# Patient Record
Sex: MALE | Race: BLACK OR AFRICAN AMERICAN
[De-identification: names, ages, dates, MRNs, and addresses within clinical notes are randomized per-mention and may not be internally consistent; named-entity substitution may affect disease eponyms.]

---

## 2017-05-16 NOTE — DIAGNOSTIC IMAGING REPORT
Indication: Chest pain, productive cough



Technique:  Single portable AP view of the chest.



Findings:



Comparison:  None.



Small circumscribed lucencies now project over the glenoid process of the right

scapula. Small triangular osseous density now projects adjacent to the distal margin

of the left clavicle. The extra pulmonary soft tissues, cardiomediastinal

silhouette, pulmonary vasculature and parenchyma, and pleural surfaces are

unremarkable.



IMPRESSION:



No evidence of acute cardiopulmonary disease, unchanged



Suggestion of interval fracture of distal end of left clavicle, acuity

indeterminate. Recommend dedicated left shoulder radiographs for further evaluation.



Small cystic lucencies projecting over right glenoid process, nonspecific, may

represent overlying soft tissue gas, vacuum phenomenon within the glenohumeral

joint, or degenerative cysts. Recommend dedicated right shoulder radiographs for

further evaluation..

## 2017-05-19 NOTE — EMERGENCY ROOM REPORT
History of Present Illness


General


Chief Complaint:  Upper Respiratory Illness


Source:  Patient





Present Illness


HPI


Patient's 21-year-old male who presented after having increased cough and nasal 

congestion for several weeks.  Patient gradual onset of symptoms.  Patient was 

noted to have been coughing some yellow-green sputum.  He had not been 

vomiting.  He denied any fever.  He reported having a moderate sore throat.  He 

denied any leg pain or swelling.  Patient had symptoms for several months.  The 

patient had a previous been taking allergy medications.


Allergies:  


Coded Allergies:  


     No Known Allergies (Unverified , 2/7/13)





Patient History


Past Medical History:  see triage record


Reviewed Nursing Documentation:  PMH: Agreed, PSxH: Agreed





Nursing Documentation-PMH


Past Medical History:  No Stated History





Review of Systems


All Other Systems:  negative except mentioned in HPI





Physical Exam





Vital Signs








  Date Time  Temp Pulse Resp B/P Pulse Ox O2 Delivery O2 Flow Rate FiO2


 


5/15/17 20:31 98.1 86 18 122/44 98 Room Air  








General Appearance:  well appearing, no apparent distress, alert, GCS 15


Head:  normocephalic, atraumatic


ENT:  hearing grossly normal, normal voice


Neck:  full range of motion, supple


Respiratory:  no respiratory distress, speaking full sentences


Gastrointestinal:  normal inspection, normal bowel sounds, non tender, soft


Musculoskeletal:  normal inspection, back normal, digits/nails normal, no calf 

tenderness


Neurologic:  normal inspection, alert, oriented x3, responsive, normal gait


Psychiatric:  mood/affect normal


Skin:  no rash





Medical Decision Making


Diagnostic Impression:  


 Primary Impression:  


 Chronic cough


ER Course


Patient presented for cough. Differential diagnosis included but was not 

limited to bronchitis, pneumonia, pulmonary embolism, pericarditis, asthma, 

foreign body.


X-ray imaging of the chest one view interpreted by me showed no evident 

infiltrate with normal cardiac size with no pneumothorax.  The patient was 

given symptomatic treatment prescriptions.The patient is advised to follow up 

with  primary care doctor in 1-2 days.  Patient is advised to return if any 

worsening condition or if any changes in status that are concerning.





Last Vital Signs








  Date Time  Temp Pulse Resp B/P Pulse Ox O2 Delivery O2 Flow Rate FiO2


 


5/15/17 21:45 98.1  18 122/44 98 Room Air  


 


5/15/17 20:35  72      








Status:  improved


Disposition:  HOME, SELF-CARE


Condition:  Stable


Scripts


Guaifenesin/Codeine Phos* (ROBITUSSIN AC*) 118 Ml Liquid


5 ML ORAL Q6H Y for For Cough, #118 ML 0 Refills


   Prov: Reid Donohue         5/15/17 


Loratadine (CLARITIN) 10 Mg Tablet


10 MG ORAL DAILY, #30 TAB


   Prov: Reid Donohue         5/15/17


Referrals:  


NOT CHOSEN [facility],REFERRING (PCP)


Patient Instructions:  Upper Respiratory Infection, Adult











Reid Donohue May 19, 2017 19:36

## 2017-06-14 NOTE — EMERGENCY ROOM REPORT
History of Present Illness


General


Chief Complaint:  Male Urogenital Problems


Source:  Patient





Present Illness


HPI


21YOM walk-in with 2 complaints


1) Cough for 6 months. Dry.  No associated fever/chills.  Improved with Rx 

cough syrup, loratadine given here in May.  + fam history of asthma.  Cough 

worse after training for football.


2) Left testicle pain. No acute trauma.  No dysuria, dishcharge. 1 day.  

Negative STD testing last week.


Allergies:  


Coded Allergies:  


     No Known Allergies (Unverified , 2/7/13)





Patient History


Past Medical History:  none


Past Surgical History:  none


Pertinent Family History:  asthma


Social History:  Denies: alcohol use, drug use, smoking


Immunizations:  UTD


Reviewed Nursing Documentation:  PMH: Agreed, PSxH: Agreed





Review of Systems


All Other Systems:  negative except mentioned in HPI





Physical Exam





Vital Signs








  Date Time  Temp Pulse Resp B/P Pulse Ox O2 Delivery O2 Flow Rate FiO2


 


6/14/17 00:36 98.1 80 18 118/65 98 Room Air  








Sp02 EP Interpretation:  reviewed, normal


General Appearance:  normal inspection, well appearing, no apparent distress, 

alert, GCS 15, non-toxic, other - Very well appearing, laughing, texting on 

cell phone


Head:  normocephalic, atraumatic


Eyes:  bilateral eye EOMI, bilateral eye PERRL


ENT:  normal ENT inspection, hearing grossly normal, normal voice


Neck:  normal inspection, full range of motion, supple, no bony tend


Respiratory:  normal inspection, lungs clear, normal breath sounds, no 

respiratory distress, no retraction, no wheezing


Cardiovascular #1:  regular rate, rhythm, no edema


Gastrointestinal:  normal inspection, normal bowel sounds, non tender, soft, no 

guarding, no hernia


Genitourinary:  no CVA tenderness, penis normal, scrotum normal


Musculoskeletal:  normal inspection, back normal, normal range of motion, Hilario'

s Sign negative


Neurologic:  normal inspection, alert, oriented x3, responsive, CNs III-XII nml 

as tested, speech normal


Psychiatric:  normal inspection, judgement/insight normal, mood/affect normal


Skin:  normal inspection, normal color, no rash





Medical Decision Making


Diagnostic Impression:  


 Primary Impression:  


 Chronic cough


 Additional Impression:  


 Left testicular pain


ER Course


Chronic cough


- VSS. Afebrile.


- Not hypoxic.  No acute distress


- Not worse with lying down, after eating.  Doubt GERD


- Worse with exercising, + fam history of asthma.  Possible exercise induced 

asthma


- Given duration, CAP also possibility although bronchitis viral most likely Dx


- Rx Z-pack, Refill cough medicine, Ventolin to be used before/after training/

exercise





Left testicular pain


- No acute trauma


- Non focal exam


- Doubt torsion given well apperance, normal vertical lie of left testicle


- UA: No WBCs or hematuria.  Doubt epididmytis. 


- Advised to refrain from frequent sexual intercourse until pain resolves





Last Vital Signs








  Date Time  Temp Pulse Resp B/P Pulse Ox O2 Delivery O2 Flow Rate FiO2


 


6/14/17 00:36 98.1 80 18 118/65 98 Room Air  








Status:  improved


Disposition:  HOME, SELF-CARE


Scripts


Albuterol Sulfate (VENTOLIN HFA) 18 Gm Hfa.aer.ad


1 PUFF INH EVERY 6 HOURS for For Cough, #18 GM 0 Refills


   Prov: MARYANN KOTHARI M.D.         6/14/17 


Azithromycin* (ZITHROMAX*) 250 Mg Tablet


250 MG ORAL DAILY, #6 TAB 0 Refills


   Take two tablets by mouth today, then take one tablet by mouth daily


   for four days


   Prov: MARYANN KOTHARI M.D.         6/14/17 


Guaifenesin/Codeine Phos* (ROBITUSSIN AC*) 118 Ml Liquid


5 ML ORAL Q6H Y for For Cough, #118 ML 0 Refills


   Prov: MARYANN KOTHARI M.D.         6/14/17











MARYANN KOTHARI M.D. Jun 14, 2017 00:58

## 2017-08-24 NOTE — EMERGENCY ROOM REPORT
History of Present Illness


General


Chief Complaint:  Earache


Source:  Patient





Present Illness


HPI


This is a 22-year-old male with no past medical history.  He presents with 

chief complaint.  One is a chronic cough that has been ongoing for many months.

  He said it was started in December when he was in [place].  He gets sick and 

this has been coughing on and off.  Seen multiple doctors here and had urgent 

care.  Been on antibiotics with no relief.  Cough medicine without any relief.  

Also got steroids.  He came in also with the complaint of left ear pain.  Onset 

for last 2 days.  No recent cold.  No nausea no vomiting.  No fever or chills.


Allergies:  


Coded Allergies:  


     No Known Allergies (Unverified , 2/7/13)





Patient History


Past Medical History:  see triage record, old chart reviewed


Past Surgical History:  none


Pertinent Family History:  none


Social History:  Denies: smoking


Immunizations:  other


Reviewed Nursing Documentation:  PMH: Agreed, PSxH: Agreed





Nursing Documentation-PMH


Past Medical History:  No Stated History





Review of Systems


Eye:  Denies: eye pain, blurred vision


ENT:  Reports: ear pain, Denies: nose congestion, throat swelling


Respiratory:  Reports: cough, Denies: shortness of breath


Cardiovascular:  Denies: chest pain, palpitations


Gastrointestinal:  Denies: abdominal pain, diarrhea, nausea, vomiting


Musculoskeletal:  Denies: back pain, joint pain


Skin:  Denies: rash


Neurological:  Denies: headache, numbness


Endocrine:  Denies: increased thirst, increased urine


Hematologic/Lymphatic:  Denies: easy bruising


All Other Systems:  negative except mentioned in HPI





Physical Exam





Vital Signs








  Date Time  Temp Pulse Resp B/P (MAP) Pulse Ox O2 Delivery O2 Flow Rate FiO2


 


8/24/17 02:02 97.9 64 14 149/79 100 Room Air  





vitals unremarkable


Sp02 EP Interpretation:  reviewed, normal


General Appearance:  well appearing, no apparent distress, alert


Head:  normocephalic, atraumatic


Eyes:  bilateral eye PERRL, bilateral eye EOMI


ENT:  hearing grossly normal, normal pharynx, other - Left TM with effusion and 

bulging TM.


Neck:  full range of motion, supple, no meningismus


Respiratory:  chest non-tender, lungs clear, normal breath sounds


Cardiovascular #1:  regular rate, rhythm, no murmur


Gastrointestinal:  normal bowel sounds, non tender, no mass, no organomegaly, 

no bruit, non-distended


Musculoskeletal:  back normal, gait/station normal, normal range of motion


Psychiatric:  mood/affect normal


Skin:  warm/dry





Medical Decision Making


Diagnostic Impression:  


 Primary Impression:  


 Chronic cough


 Additional Impression:  


 Left otitis media


 Qualified Codes:  H65.02 - Acute serous otitis media, left ear


ER Course


Patient with a cough.  Lungs are clear.  No evidence of pneumonia.  This may be 

a postnasal drip.  No evidence of pneumonia.  His ear show effusion.  Most 

likely sinus related.  We'll put on decongestant antibiotics.  He may have 

allergies.  If not better he may benefit from referral to see a specialist.





Last Vital Signs








  Date Time  Temp Pulse Resp B/P (MAP) Pulse Ox O2 Delivery O2 Flow Rate FiO2


 


8/24/17 02:02 97.9 64 14 149/79 100 Room Air  








Status:  unchanged


Disposition:  HOME, SELF-CARE


Condition:  Stable


Scripts


Amoxicillin/Potassium Clav 875-125* (AUGMENTIN 875-125 TABLET*) 1 Each Tablet


1 TAB ORAL TWICE A DAY, #14 TAB


   Prov: TI BARKLEY M.D.         8/24/17 


Loratadine/Pseudoephedrine (CLARITIN-D 24 HOUR TABLET) 1 Each Tab.er.24h


1 TAB PO DAILY, #30 TAB


   Prov: TI BARKLEY M.D.         8/24/17


Patient Instructions:  Otitis Media, Adult, Easy-to-Read





Additional Instructions:  


Followup with your DrSamantha in 7 days.  You may benefit from referral to see if nose 

and throat DrSamantha or one specialist if not better.  Return if symptom worsen.











TI BARKLEY M.D. Aug 24, 2017 02:20

## 2018-02-25 NOTE — EMERGENCY ROOM REPORT
History of Present Illness


General


Chief Complaint:  General Complaint


Source:  Patient





Present Illness


HPI


Patient presents with complaints of cough 


Reports of the cough has been ongoing for the past 2 years





Denies any chest pain or shortness of breath


Denies any vomiting patient feels that he has been more gassy than normal





Denies any vomiting or diarrhea denies any recent travel





However he reports that 2 years ago when he visited [place] is when his 

symptoms initially started


Allergies:  


Coded Allergies:  


     No Known Allergies (Unverified , 2/7/13)





Patient History


Past Medical History:  see triage record


Pertinent Family History:  none


Reviewed Nursing Documentation:  PMH: Agreed, PSxH: Agreed





Nursing Documentation-PMH


Past Medical History:  No Stated History





Review of Systems


All Other Systems:  negative except mentioned in HPI





Physical Exam





Vital Signs








  Date Time  Temp Pulse Resp B/P (MAP) Pulse Ox O2 Delivery O2 Flow Rate FiO2


 


2/25/18 22:47 97.7 70 16 131/78 99 Room Air  





 97.7       








Sp02 EP Interpretation:  reviewed, normal


General Appearance:  well appearing, no apparent distress


Head:  normocephalic, atraumatic


Eyes:  bilateral eye PERRL, bilateral eye EOMI


ENT:  hearing grossly normal, normal pharynx, TMs + canals normal, uvula midline


Neck:  full range of motion, supple, no meningismus, no bony tend


Respiratory:  lungs clear, normal breath sounds, no rhonchi, no respiratory 

distress, no retraction, no accessory muscle use


Cardiovascular #1:  normal peripheral pulses, regular rate, rhythm, no edema, 

no gallop, no JVD, no murmur


Gastrointestinal:  normal bowel sounds, non tender, soft, no mass, no 

organomegaly, non-distended, no guarding, no hernia, no pulsatile mass, no 

rebound


Genitourinary:  no CVA tenderness


Musculoskeletal:  normal inspection


Neurologic:  oriented x3, responsive, CNs III-XII nml as tested, motor strength/

tone normal, sensory intact


Psychiatric:  mood/affect normal


Skin:  normal color, no rash, warm/dry, palpation normal


Lymphatic:  normal inspection, no adenopathy





Medical Decision Making


Diagnostic Impression:  


 Primary Impression:  


 Chronic cough


ER Course


Will differentials considered


Including but not limited to cardiac, cardiopulmonary, gastric pathology





Patient has had previous x-ray here


There appears to be no significant change from his previous presentation





Patient remains hemodynamically stable lungs sounds are appropriate


Patient reports that he has not been able to see any primary physician since 

his initial visit





And was provided with appropriate outpatient followup





Last Vital Signs








  Date Time  Temp Pulse Resp B/P (MAP) Pulse Ox O2 Delivery O2 Flow Rate FiO2


 


2/25/18 22:47 97.7 70 16 131/78 99 Room Air  





 97.7       








Status:  unchanged


Disposition:  HOME, SELF-CARE


Condition:  Stable





Additional Instructions:  


Patient is provided with the discharge instructions notified to follow up with 

primary doctor in the next 2-3 days otherwise return to the er with any 

worsening symptoms.


Please note that this report is being documented using DRAGON technology.  This 

can lead to erroneous entry secondary to incorrect interpretation by the 

dictating instrument.











WAYNE ABRAMS D.O. Feb 25, 2018 23:14

## 2020-05-12 ENCOUNTER — HOSPITAL ENCOUNTER (EMERGENCY)
Dept: HOSPITAL 72 - EMR | Age: 25
Discharge: HOME | End: 2020-05-12
Payer: MEDICAID

## 2020-05-12 VITALS — DIASTOLIC BLOOD PRESSURE: 80 MMHG | SYSTOLIC BLOOD PRESSURE: 122 MMHG

## 2020-05-12 VITALS — WEIGHT: 180 LBS | BODY MASS INDEX: 27.28 KG/M2 | HEIGHT: 68 IN

## 2020-05-12 VITALS — SYSTOLIC BLOOD PRESSURE: 125 MMHG | DIASTOLIC BLOOD PRESSURE: 82 MMHG

## 2020-05-12 DIAGNOSIS — R07.89: Primary | ICD-10-CM

## 2020-05-12 DIAGNOSIS — Z72.89: ICD-10-CM

## 2020-05-12 PROCEDURE — 99283 EMERGENCY DEPT VISIT LOW MDM: CPT

## 2020-05-12 PROCEDURE — 93005 ELECTROCARDIOGRAM TRACING: CPT

## 2020-05-12 PROCEDURE — 71045 X-RAY EXAM CHEST 1 VIEW: CPT

## 2020-05-12 NOTE — EMERGENCY ROOM REPORT
History of Present Illness


General


Chief Complaint:  General Complaint


Source:  Patient





Present Illness


HPI


24-year-old male presents with vague chest discomfort x4 days no aggravating 

alleviating factors severity is mild, intermittent patient notices that he has 

a lot of reflux-like symptoms he drinks some alcohol, no dyspnea on exertion no 

shortness of breath no nausea no vomiting, patient presents for evaluation


Allergies:  


Coded Allergies:  


     No Known Allergies (Unverified , 6/5/19)





COVID-19 Screening


Contact w/high risk pt:  No


Recent Travel to affected area:  No


Experienced COVID-19 symptoms?:  No





Patient History


Past Medical History:  see triage record


Social History:  Reports: alcohol use


Reviewed Nursing Documentation:  PMH: Agreed; PSxH: Agreed





Nursing Documentation-PMH


Past Medical History:  No Stated History





Review of Systems


All Other Systems:  negative except mentioned in HPI





Physical Exam





Vital Signs








  Date Time  Temp Pulse Resp B/P (MAP) Pulse Ox O2 Delivery O2 Flow Rate FiO2


 


5/12/20 21:04 98.4 75 18 126/81 (96) 98 Room Air  








Sp02 EP Interpretation:  reviewed, normal


General Appearance:  well appearing, no apparent distress, alert


Head:  normocephalic, atraumatic


Eyes:  bilateral eye PERRL, bilateral eye EOMI


ENT:  uvula midline, moist mucus membranes


Neck:  supple, thyroid normal, supple/symm/no masses


Respiratory:  lungs clear, no respiratory distress, no retraction, no accessory 

muscle use


Cardiovascular #1:  normal peripheral pulses, regular rate, rhythm, no edema, 

no gallop, no murmur


Gastrointestinal:  non tender, soft, no guarding, no rebound


Musculoskeletal:  normal inspection


Neurologic:  alert, oriented x3


Psychiatric:  mood/affect normal


Skin:  no rash, warm/dry





Medical Decision Making


Diagnostic Impression:  


 Primary Impression:  


 Atypical chest pain


ER Course


No evidence of ACS, pulmonary embolism, pneumothorax, pneumonia. Historically 

not abrupt in onset, tearing or ripping, pulses symmetric, no evidence of 

aortic dissection.


Patient most likely with GERD-like symptoms will provide famotidine disposition 

home with return cautions follow-up with PCP


EKG Diagnostic Results


EKG Time:  21:18


EP Interpretation:  NSR, rate 65, QTc 391, no acute ST elevations, normal axis





Chest X-Ray Diagnostic Results


Chest X-Ray Diagnostic Results :  


   Chest X-Ray Ordered:  Yes


   # of Views/Limited/Complete:  1 View


   Indication:  Chest Pain


   Interpretation:  no consolidation, no effusion, no pneumothorax, no acute 

cardiopulmonary disease


   Impression:  No acute disease


   Electronically Signed by:  Luis Chan MD





Last Vital Signs








  Date Time  Temp Pulse Resp B/P (MAP) Pulse Ox O2 Delivery O2 Flow Rate FiO2


 


5/12/20 21:15 98.4 72 18 122/80 98 Room Air  








Disposition:  HOME, SELF-CARE


Condition:  Stable


Scripts


Famotidine* (Pepcid 20mg tablet*) 20 Mg Tablet


20 MG ORAL TWICE A DAY, #60 TAB 0 Refills


   Prov: Luis Chan MD         5/12/20


Referrals:  


[facility] ([facility]P)


Patient Instructions:  Gastroesophageal Reflux Disease, Adult, Easy-to-Read





Additional Instructions:  


The patient was provided with discharge instructions, notified to follow-up 

with a primary care doctor and or specialist in the next 24-48 hours, and to 

return to the ED if they have worsening of their symptoms. 





Please note that this report is being documented using DRAGON technology.


This can lead to erroneous entry secondary to incorrect interpretation by the 

dictating instrument.











Luis Chan MD May 12, 2020 21:42

## 2020-05-12 NOTE — NUR
ER DISCHARGE NOTE:



Patient is cleared to be discharged per ERMD, pt is aox4, on room air, with 
stable vital signs. pt was given dc and prescription instructions, pt was able 
to verbalize understanding, pt id band removed. pt is able to ambulate with 
steady gait. pt took all belongings. pt stable upon discharge.

## 2020-05-12 NOTE — NUR
ED Nurse Note:



Patient walked in from home d/t left chest tightness radiating to left 
shoulder. Patient aao x 4 and ambulatory with steady gait. No other medical 
history. Patient denies cough or shortness of breath. No acute distress noted.

## 2020-07-10 ENCOUNTER — HOSPITAL ENCOUNTER (EMERGENCY)
Dept: HOSPITAL 72 - EMR | Age: 25
Discharge: HOME | End: 2020-07-10
Payer: MEDICAID

## 2020-07-10 VITALS — DIASTOLIC BLOOD PRESSURE: 70 MMHG | SYSTOLIC BLOOD PRESSURE: 115 MMHG

## 2020-07-10 VITALS — WEIGHT: 180 LBS | HEIGHT: 68 IN | BODY MASS INDEX: 27.28 KG/M2

## 2020-07-10 VITALS — DIASTOLIC BLOOD PRESSURE: 76 MMHG | SYSTOLIC BLOOD PRESSURE: 129 MMHG
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
## 2020-07-10 DIAGNOSIS — S46.202A: Primary | ICD-10-CM
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
## 2020-07-10 PROCEDURE — 99283 EMERGENCY DEPT VISIT LOW MDM: CPT

## 2020-07-10 PROCEDURE — 73030 X-RAY EXAM OF SHOULDER: CPT

## 2020-07-10 NOTE — NUR
ED Nurse Note:



all medications administered, pt tolerated well no ss of distress noted. will 
continue to monitor.

## 2020-07-10 NOTE — EMERGENCY ROOM REPORT
History of Present Illness


General


Chief Complaint:  Upper Extremity Injury


Source:  Patient





Present Illness


HPI


24-year-old male presents to ED complaining of left shoulder pain.  Started 

yesterday after lifting weights at the gym.  Felt a pop in his left shoulder.  

Dull, 6 out of 10, nonradiating.  Notes full range of motion.  Denies any other 

injuries.  No other aggravating relieving factors.  Denies any other associated 

symptoms


Allergies:  


Coded Allergies:  


     No Known Allergies (Unverified , 6/5/19)





COVID-19 Screening


Contact w/high risk pt:  No


Recent Travel to affected area:  No


Experienced COVID-19 symptoms?:  No


COVID-19 Testing performed PTA:  No





Patient History


Past Medical History:  none


Past Surgical History:  none


Pertinent Family History:  none


Social History:  Denies: smoking, alcohol use, drug use


Immunizations:  UTD


Reviewed Nursing Documentation:  PMH: Agreed; PSxH: Agreed





Nursing Documentation-PMH


Past Medical History:  No Stated History





Review of Systems


All Other Systems:  negative except mentioned in HPI





Physical Exam





Vital Signs








  Date Time  Temp Pulse Resp B/P (MAP) Pulse Ox O2 Delivery O2 Flow Rate FiO2


 


7/10/20 19:33 98.2 82 18 129/76 (93) 95 Room Air  








Sp02 EP Interpretation:  reviewed, normal


General Appearance:  no apparent distress, alert, GCS 15, non-toxic


Head:  normocephalic, atraumatic


Eyes:  bilateral eye normal inspection, bilateral eye PERRL


ENT:  hearing grossly normal, normal pharynx, no angioedema, normal voice


Neck:  full range of motion, supple/symm/no masses


Respiratory:  chest non-tender, lungs clear, normal breath sounds, speaking 

full sentences


Cardiovascular #1:  regular rate, rhythm, no edema


Cardiovascular #2:  2+ carotid (R), 2+ carotid (L), 2+ radial (R), 2+ radial (L)

, 2+ dorsalis pedis (R), 2+ dorsalis pedis (L)


Gastrointestinal:  normal bowel sounds, non tender, soft, non-distended, no 

guarding, no rebound


Rectal:  deferred


Genitourinary:  normal inspection, no CVA tenderness


Musculoskeletal:  back normal, normal range of motion, gait/station normal, 

tender - L bicep


Neurologic:  alert, motor strength/tone normal, oriented x3, sensory intact, 

responsive, speech normal


Psychiatric:  judgement/insight normal, memory normal, mood/affect normal, no 

suicidal/homicidal ideation


Reflexes:  3+ bicep (R), 3+ bicep (L), 3+ tricep (R), 3+ tricep (L), 3+ knee (R)

, 3+ knee (L)


Lymphatic:  no adenopathy





Procedures


Splinting


Splinting :  


   Consent:  Verbal


   Pre-Made Type:  sling


   Pre-Proc Neuro Vasc Exam:  normal


   Post-Proc Neuro Vasc Exam:  normal


   Patient Tolerated:  Well


   Complications:  None





Medical Decision Making


Diagnostic Impression:  


 Primary Impression:  


 Injury of biceps brachii muscle





Other X-Ray Diagnostic Results


Other X-Ray Diagnostic Results :  


   X-Ray ordered:  L shoulder


   # of Views/Limited Vs Complete:  3 View


   Indication:  Pain


   EP Interpretation:  Yes


   Interpretation:  no dislocation, no soft tissue swelling, no fractures


   Impression:  No acute disease


   Electronically Signed by:  Electronically signed by Luis Quiros MD





Last Vital Signs








  Date Time  Temp Pulse Resp B/P (MAP) Pulse Ox O2 Delivery O2 Flow Rate FiO2


 


7/10/20 20:50 98.2 76 18 115/70 99 Room Air  








Status:  improved


Disposition:  HOME, SELF-CARE


Condition:  Stable


Scripts


Ibuprofen* (MOTRIN*) 600 Mg Tablet


600 MG ORAL Q8H PRN for FOR PAIN, #30 TAB 0 Refills


   Prov: Luis Quiros MD         7/10/20


Referrals:  


[facility] ([facility]P)











[facility]





[facility]  **Open 24 hour /7 days a week by Appointment Only**





[address] [facility] [address]


[facility] [address]


Patient Instructions:  Biceps Tendon Disruption (Proximal) With [facility]











Luis Quiros MD Jul 10, 2020 22:40

## 2020-07-10 NOTE — NUR
ER DISCHARGE NOTE:

Patient is cleared to be discharged per ERMD, pt is aox4, on room air, with 
stable vital signs. pt was given dc and prescription instructions, pt was able 
to verbalize understanding, pt id band and removed without complications. pt is 
able to ambulate with steady gait. pt took all belongings.

## 2020-07-10 NOTE — DIAGNOSTIC IMAGING REPORT
EXAM:

  XR Left Shoulder Complete, 2 or More Views

 

CLINICAL HISTORY:

  PAIN

 

TECHNIQUE:

  Two or more views of the left shoulder.

 

COMPARISON:

  No relevant prior studies available.

 

FINDINGS:

  Bones/joints:  No fracture or malalignment.  Chronic ossification at 

the distal clavicle, possible sequela of prior trauma.

  Soft tissues:  Unremarkable.

 

IMPRESSION:     

1.  No fracture or malalignment.

2.  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 

prior trauma.

## 2020-07-10 NOTE — NUR
ED Nurse Note:



Pt ambulated into ed from home CO left shoulder pain after weight lifting. Pt 
states he heard a "pop" sound and noticed a bump on upper left arm near 
shoulder area. Pt ROM decreased in affected arm, pt denies losing sensation, 
having numbness or tingling. Pt aao x 4, ambulatory with steady gait. awaiting 
ERMD at bedside.

## 2021-01-25 ENCOUNTER — HOSPITAL ENCOUNTER (EMERGENCY)
Dept: HOSPITAL 72 - EMR | Age: 26
Discharge: HOME | End: 2021-01-25
Payer: MEDICAID

## 2021-01-25 VITALS — BODY MASS INDEX: 28.79 KG/M2 | HEIGHT: 68 IN | WEIGHT: 190 LBS

## 2021-01-25 VITALS — DIASTOLIC BLOOD PRESSURE: 72 MMHG | SYSTOLIC BLOOD PRESSURE: 130 MMHG

## 2021-01-25 VITALS — DIASTOLIC BLOOD PRESSURE: 77 MMHG | SYSTOLIC BLOOD PRESSURE: 131 MMHG

## 2021-01-25 DIAGNOSIS — M47.816: ICD-10-CM
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
## 2021-01-25 DIAGNOSIS — T14.8XXA: ICD-10-CM

## 2021-01-25 DIAGNOSIS — M54.9: Primary | ICD-10-CM

## 2021-01-25 LAB
APPEARANCE UR: CLEAR
APTT PPP: (no result) S
GLUCOSE UR STRIP-MCNC: NEGATIVE MG/DL
KETONES UR QL STRIP: NEGATIVE
LEUKOCYTE ESTERASE UR QL STRIP: NEGATIVE
NITRITE UR QL STRIP: NEGATIVE
PH UR STRIP: 7 [PH] (ref 4.5–8)
PROT UR QL STRIP: NEGATIVE
SP GR UR STRIP: 1.01 (ref 1–1.03)
UROBILINOGEN UR-MCNC: NORMAL MG/DL (ref 0–1)

## 2021-01-25 PROCEDURE — 96372 THER/PROPH/DIAG INJ SC/IM: CPT

## 2021-01-25 PROCEDURE — 80307 DRUG TEST PRSMV CHEM ANLYZR: CPT

## 2021-01-25 PROCEDURE — 74176 CT ABD & PELVIS W/O CONTRAST: CPT

## 2021-01-25 PROCEDURE — 81003 URINALYSIS AUTO W/O SCOPE: CPT

## 2021-01-25 PROCEDURE — 99284 EMERGENCY DEPT VISIT MOD MDM: CPT

## 2021-01-25 NOTE — EMERGENCY ROOM REPORT
History of Present Illness


General


Chief Complaint:  Pain


Source:  Patient





Present Illness


HPI


Patient is a 25-year-old male denies any significant past medical history who 

presents to the ER complaining of right-sided flank pain.  Patient states that 

it started 1 week ago and has been getting worse.  He denies any trauma.  He 

denies any fever or chills.  He denies any abdominal pain nausea or vomiting.  

He denies any dysuria or hematuria.  He states he has not taken any medications 

for the pain.  No history of similar symptoms.  No changes to his bowel or 

bladder habits.  No focal weakness and no paresthesias.


Allergies:  


Coded Allergies:  


     No Known Allergies (Unverified , 6/5/19)





COVID-19 Screening


Contact w/high risk pt:  No


Recent Travel to affected area:  No


Experienced COVID-19 symptoms?:  No


COVID-19 Testing performed PTA:  No





Patient History


Reviewed Nursing Documentation:  PMH: Agreed; PSxH: Agreed





Nursing Documentation-PMH


Past Medical History:  No Stated History





Review of Systems


All Other Systems:  negative except mentioned in HPI





Physical Exam





Vital Signs








  Date Time  Temp Pulse Resp B/P (MAP) Pulse Ox O2 Delivery O2 Flow Rate FiO2


 


1/25/21 19:32 98.2 80 18 114/83 (93) 95 Room Air  








Sp02 EP Interpretation:  reviewed, normal


General Appearance:  no apparent distress, alert, GCS 15, non-toxic


Head:  normocephalic, atraumatic


Eyes:  bilateral eye normal inspection, bilateral eye PERRL


ENT:  hearing grossly normal, normal pharynx, no angioedema, normal voice


Neck:  full range of motion, supple/symm/no masses


Respiratory:  chest non-tender, lungs clear, normal breath sounds, speaking full

sentences


Cardiovascular #1:  regular rate, rhythm, no edema


Gastrointestinal:  normal bowel sounds, non tender, soft, non-distended, no guar

ding, no rebound


Rectal:  deferred, other - No saddle anesthesia


Musculoskeletal:  normal range of motion, other - Right flank pain but no 

definite CVA tenderness


Neurologic:  CNs III-XII nml as tested, oriented x3


Psychiatric:  no suicidal/homicidal ideation


Skin:  no rash


Lymphatic:  no adenopathy





Medical Decision Making


Diagnostic Impression:  


   Primary Impression:  


   Back pain


   Additional Impression:  


   Muscle strain


ER Course


Patient given Toradol IM which relieved his pain.  Patient's CT demonstrates no 

acute intra-abdominal or pelvic pathology.  UA negative.  After discussing risks

and benefits of further diagnostics, treatment plans, as well as indications for

and risks of admission, the patient is agreeable to being discharged home.  I 

have explained that their evaluation and treatment in the emergency department 

today is an important step towards them achieving better health but that their 

evaluation today is not intended to replace further evaluation and treatment by 

a physician in their local clinic.  I have explained that while the current 

findings suggest no immediate life threatening emergency they will require 

further evaluation and treatment by a physician of their choice in their area.  

They understand that it will be necessary for them to review the final reports 

of their ED visit with their clinic physician.  We have reviewed indications for

return to the Emergency Department.  I have explained that additional time may 

need to pass and/or additional testing as an outpatient may be necessary before 

a definitive diagnosis can be made.  They tell me they are willing to follow up 

as instructed within the timeframe I recommend.  They appear to understand what 

we discussed.  Additionally they understand that if they are unable to be seen 

by an outpatient physician they are welcome, and in fact should, return to the 

Emergency Department for a repeat evaluation.  The patient is stable at time of 

discharge.





Laboratory Tests








Test


 1/25/21


19:16


 


Urine Color Pale yellow  


 


Urine Appearance Clear  


 


Urine pH 7 (4.5-8.0)  


 


Urine Specific Gravity


 1.010


(1.005-1.035)


 


Urine Protein


 Negative


(NEGATIVE)


 


Urine Glucose (UA)


 Negative


(NEGATIVE)


 


Urine Ketones


 Negative


(NEGATIVE)


 


Urine Blood


 Negative


(NEGATIVE)


 


Urine Nitrite


 Negative


(NEGATIVE)


 


Urine Bilirubin


 Negative


(NEGATIVE)


 


Urine Urobilinogen


 Normal MG/DL


(0.0-1.0)


 


Urine Leukocyte Esterase


 Negative


(NEGATIVE)


 


Urine Opiates Screen Pending  


 


Urine Barbiturates Screen Pending  


 


Phencyclidine (PCP) Screen Pending  


 


Urine Amphetamines Screen Pending  


 


Urine Benzodiazepines Screen Pending  


 


Urine Cocaine Screen Pending  


 


Urine Marijuana (THC) Screen Pending  











Last Vital Signs








  Date Time  Temp Pulse Resp B/P (MAP) Pulse Ox O2 Delivery O2 Flow Rate FiO2


 


1/25/21 19:32 98.2 80 18 114/83 (93) 95 Room Air  








Disposition:  HOME, SELF-CARE


Condition:  Stable


Scripts


Naproxen* (NAPROXEN*) 500 Mg Tablet


500 MG ORAL TWICE A WEEK, #60 TAB 0 Refills


   Prov: Kristina Garces M.D.         1/25/21





Additional Instructions:  


The patient was provided with discharge instructions, notified to follow-up with

a primary care doctor and or specialist in the next 24-48 hours, and to return 

to the ED if they have worsening of their symptoms. 





Please note that this report is being documented using DRAGON technology.


This can lead to erroneous entry secondary to incorrect interpretation by the 

dictating instrument.











Kristina Garces M.D.        Jan 25, 2021 19:41

## 2021-01-25 NOTE — NUR
ED Nurse Note:



Pt given pain medication injection and toelrated well. Pain subsided to 0/10. 
Discharge paperwork given to pt and prescription also. Pt checked out and 
walked to private car.

## 2021-01-25 NOTE — NUR
ED Nurse Note:



Pt came to ED form home with right flank pain of 5/10 and sitting on gurney. 
A/O x4 and vebally responsive. No SOB or acute distress. Vitals stable.

## 2021-01-25 NOTE — DIAGNOSTIC IMAGING REPORT
EXAM:

  CT Abdomen and Pelvis Without Intravenous Contrast

 

CLINICAL HISTORY:

  FLANK

 

TECHNIQUE:

  Axial computed tomography images of the abdomen and pelvis without 

intravenous contrast.  CTDI is 6.5 mGy and DLP is 367.9 mGy-cm.  One or 

more of the following dose reduction techniques were used: automated 

exposure control, adjustment of the mA and/or kV according to patient 

size, use of iterative reconstruction technique.

  Coronal and sagittal reformatted images were created and reviewed.

 

COMPARISON:

  No relevant prior studies available.

 

FINDINGS:

  Lung bases:  Unremarkable.  No mass.  No consolidation.

 

 ABDOMEN:

  Liver:  Unremarkable.

  Gallbladder and bile ducts:  Unremarkable.  No calcified stones.  No 

ductal dilation.

  Pancreas:  Unremarkable.  No ductal dilation.

  Spleen:  Unremarkable.  No splenomegaly.

  Adrenals:  Unremarkable.  No mass.

  Kidneys and ureters:  No urolithiasis.  No hydronephrosis.

  Stomach and bowel:  Unremarkable.  No obstruction.  No mucosal 

thickening.

 

 PELVIS:

  Appendix:  No findings to suggest acute appendicitis.

  Bladder:  Urinary bladder wall thickening.  No stones.

  Reproductive:  Unremarkable as visualized.

 

 ABDOMEN and PELVIS:

  Intraperitoneal space:  Unremarkable.  No free air.  No significant 

fluid collection.

  Bones/joints:  Bilateral L5 spondylolysis with listhesis.  No acute 

fracture.  No dislocation.

  Soft tissues:  Unremarkable.

  Vasculature:  Unremarkable.  No abdominal aortic aneurysm.

  Lymph nodes:  Unremarkable.  No enlarged lymph nodes.

 

IMPRESSION:     

1.  Urinary bladder wall thickening could be incidental, due to high 

outlet pressures, or could represent cystitis.

2.  No urolithiasis.

3.  Otherwise no acute abnormality definitively identified to account for 

patient presentation.

4.  Bilateral L5 spondylolysis with listhesis.